# Patient Record
Sex: FEMALE | Race: WHITE | Employment: OTHER | URBAN - METROPOLITAN AREA
[De-identification: names, ages, dates, MRNs, and addresses within clinical notes are randomized per-mention and may not be internally consistent; named-entity substitution may affect disease eponyms.]

---

## 2017-09-25 ENCOUNTER — HOSPITAL ENCOUNTER (EMERGENCY)
Age: 72
Discharge: HOME | End: 2017-09-25
Attending: EMERGENCY MEDICINE
Payer: MEDICARE

## 2017-09-25 ENCOUNTER — APPOINTMENT (OUTPATIENT)
Dept: GENERAL RADIOLOGY | Age: 72
End: 2017-09-25
Attending: EMERGENCY MEDICINE
Payer: MEDICARE

## 2017-09-25 ENCOUNTER — APPOINTMENT (OUTPATIENT)
Dept: CT IMAGING | Age: 72
End: 2017-09-25
Attending: EMERGENCY MEDICINE
Payer: MEDICARE

## 2017-09-25 VITALS
OXYGEN SATURATION: 96 % | WEIGHT: 175 LBS | TEMPERATURE: 98.2 F | HEIGHT: 60 IN | SYSTOLIC BLOOD PRESSURE: 171 MMHG | DIASTOLIC BLOOD PRESSURE: 53 MMHG | RESPIRATION RATE: 16 BRPM | HEART RATE: 71 BPM | BODY MASS INDEX: 34.36 KG/M2

## 2017-09-25 DIAGNOSIS — Z99.2 ESRD ON HEMODIALYSIS (HCC): ICD-10-CM

## 2017-09-25 DIAGNOSIS — E87.5 ACUTE HYPERKALEMIA: Primary | ICD-10-CM

## 2017-09-25 DIAGNOSIS — G25.3 MYOCLONUS: ICD-10-CM

## 2017-09-25 DIAGNOSIS — N18.6 ESRD ON HEMODIALYSIS (HCC): ICD-10-CM

## 2017-09-25 LAB
ANION GAP SERPL CALC-SCNC: 7 MMOL/L (ref 5–15)
BASOPHILS # BLD: 0.1 K/UL (ref 0–0.1)
BASOPHILS NFR BLD: 1 % (ref 0–1)
BUN SERPL-MCNC: 66 MG/DL (ref 6–20)
BUN/CREAT SERPL: 8 (ref 12–20)
CALCIUM SERPL-MCNC: 8.9 MG/DL (ref 8.5–10.1)
CHLORIDE SERPL-SCNC: 97 MMOL/L (ref 97–108)
CO2 SERPL-SCNC: 28 MMOL/L (ref 21–32)
CREAT SERPL-MCNC: 8.64 MG/DL (ref 0.55–1.02)
EOSINOPHIL # BLD: 0.3 K/UL (ref 0–0.4)
EOSINOPHIL NFR BLD: 3 % (ref 0–7)
ERYTHROCYTE [DISTWIDTH] IN BLOOD BY AUTOMATED COUNT: 17.3 % (ref 11.5–14.5)
GLUCOSE SERPL-MCNC: 174 MG/DL (ref 65–100)
HCT VFR BLD AUTO: 37.5 % (ref 35–47)
HGB BLD-MCNC: 11.6 G/DL (ref 11.5–16)
LYMPHOCYTES # BLD: 1.9 K/UL (ref 0.8–3.5)
LYMPHOCYTES NFR BLD: 21 % (ref 12–49)
MAGNESIUM SERPL-MCNC: 2.3 MG/DL (ref 1.6–2.4)
MCH RBC QN AUTO: 29.1 PG (ref 26–34)
MCHC RBC AUTO-ENTMCNC: 30.9 G/DL (ref 30–36.5)
MCV RBC AUTO: 94 FL (ref 80–99)
MONOCYTES # BLD: 0.7 K/UL (ref 0–1)
MONOCYTES NFR BLD: 8 % (ref 5–13)
NEUTS SEG # BLD: 6.2 K/UL (ref 1.8–8)
NEUTS SEG NFR BLD: 67 % (ref 32–75)
PLATELET # BLD AUTO: 261 K/UL (ref 150–400)
POTASSIUM SERPL-SCNC: 6.4 MMOL/L (ref 3.5–5.1)
RBC # BLD AUTO: 3.99 M/UL (ref 3.8–5.2)
SODIUM SERPL-SCNC: 132 MMOL/L (ref 136–145)
WBC # BLD AUTO: 9.1 K/UL (ref 3.6–11)

## 2017-09-25 PROCEDURE — 70450 CT HEAD/BRAIN W/O DYE: CPT

## 2017-09-25 PROCEDURE — 85025 COMPLETE CBC W/AUTO DIFF WBC: CPT | Performed by: EMERGENCY MEDICINE

## 2017-09-25 PROCEDURE — 83735 ASSAY OF MAGNESIUM: CPT | Performed by: EMERGENCY MEDICINE

## 2017-09-25 PROCEDURE — 80048 BASIC METABOLIC PNL TOTAL CA: CPT | Performed by: EMERGENCY MEDICINE

## 2017-09-25 PROCEDURE — 36415 COLL VENOUS BLD VENIPUNCTURE: CPT | Performed by: EMERGENCY MEDICINE

## 2017-09-25 PROCEDURE — 99285 EMERGENCY DEPT VISIT HI MDM: CPT

## 2017-09-25 PROCEDURE — 93005 ELECTROCARDIOGRAM TRACING: CPT

## 2017-09-25 PROCEDURE — 71010 XR CHEST PORT: CPT

## 2017-09-25 RX ORDER — GABAPENTIN 100 MG/1
100 CAPSULE ORAL 2 TIMES DAILY
COMMUNITY

## 2017-09-25 RX ORDER — TRAMADOL HYDROCHLORIDE 50 MG/1
50 TABLET ORAL
COMMUNITY

## 2017-09-25 RX ORDER — CLONIDINE HYDROCHLORIDE 0.1 MG/1
TABLET ORAL 2 TIMES DAILY
COMMUNITY
End: 2017-09-25

## 2017-09-25 RX ORDER — ACETAMINOPHEN 325 MG/1
325 TABLET ORAL
COMMUNITY

## 2017-09-25 RX ORDER — INSULIN GLARGINE 100 [IU]/ML
5 INJECTION, SOLUTION SUBCUTANEOUS
COMMUNITY

## 2017-09-25 RX ORDER — BUDESONIDE AND FORMOTEROL FUMARATE DIHYDRATE 160; 4.5 UG/1; UG/1
2 AEROSOL RESPIRATORY (INHALATION) 2 TIMES DAILY
COMMUNITY

## 2017-09-25 RX ORDER — ALBUTEROL SULFATE 90 UG/1
2 AEROSOL, METERED RESPIRATORY (INHALATION)
COMMUNITY

## 2017-09-25 RX ORDER — INSULIN ASPART 100 [IU]/ML
INJECTION, SOLUTION INTRAVENOUS; SUBCUTANEOUS
COMMUNITY

## 2017-09-25 RX ORDER — FOLIC ACID 1 MG/1
1 TABLET ORAL DAILY
COMMUNITY

## 2017-09-25 RX ORDER — LISINOPRIL 20 MG/1
20 TABLET ORAL DAILY
COMMUNITY

## 2017-09-25 NOTE — ED NOTES
Pt's eyes would slightly roll back in her head. Pt alert x3. Pt states, \"that always happens when I am tired and about to fall asleep. \" Dr. Rachell Joyce told.

## 2017-09-25 NOTE — ED NOTES
Pt resting in room with her eyes closed on her right side, pt alert and oriented x3, NSR on monitor,  vs stable as noted, pt has no new complaints at this time. Call bell within reach, bed in low position and locked. Pt aware to call the RN on the call bell if needs anything. Pt verbalizes understanding.

## 2017-09-25 NOTE — ED PROVIDER NOTES
HPI Comments: 66 y.o. female with past medical history significant for ESRD, DM, and iron deficiency anemia who presents to the ED via EMS with chief complaint of seizure. Per EMS, pt was about 30 minutes into dialysis treatment today when she was noted by staff to have \"seizure like activity\" so she was referred to the ED for further evaluation and did not complete treatment. Pt reports she \"thinks she may have coughed too much and couldn't breathe but does not think she had a seizure. \" Pt states she was sleeping and does not recall the event. Pt denies hx of seizures. Pt denies any complaints at this time. Pt states she wears 2L O2 at home. Pt states she was recently in rehab after being admitted for breathing issues. Pt denies any pain, extremity weakness, nausea, or vomiting. There are no other acute medical complaints voiced at this time. Social Hx: Smoker. Visiting son from out of town. PCP: No primary care provider on file. Note written by Erik Nunez Arm, as dictated by Modesta Alfred MD 12:56 PM     The history is provided by the patient and the EMS personnel. Past Medical History:   Diagnosis Date    Anemia in chronic kidney disease     Diabetes ([facility] [address]     End stage renal disease (HCC)     Iron deficiency anemia        Past Surgical History:   Procedure Laterality Date    HX CHOLECYSTECTOMY      HX HYSTERECTOMY           History reviewed. No pertinent family history. Social History     Social History    Marital status: N/A     Spouse name: N/A    Number of children: N/A    Years of education: N/A     Occupational History    Not on file.      Social History Main Topics    Smoking status: Current Every Day Smoker    Smokeless tobacco: Never Used    Alcohol use No    Drug use: Not on file    Sexual activity: Not on file     Other Topics Concern    Not on file     Social History Narrative    No narrative on file         ALLERGIES: Pcn [penicillins]    Review of Systems   Constitutional: Negative for fever. HENT: Negative for facial swelling. Eyes: Negative for visual disturbance. Respiratory: Positive for cough. Negative for chest tightness. Cardiovascular: Negative for chest pain. Gastrointestinal: Negative for abdominal pain, nausea and vomiting. Genitourinary: Negative for dysuria. Musculoskeletal: Negative for arthralgias, back pain, myalgias and neck pain. Skin: Negative for rash. Neurological: Positive for seizures. Negative for dizziness and weakness. Hematological: Negative for adenopathy. Psychiatric/Behavioral: Negative for suicidal ideas. All other systems reviewed and are negative. Vitals:    09/25/17 1233 09/25/17 1245   BP:  142/48   Pulse: 64 65   Resp: 18 15   Temp: 98.2 °F (36.8 °C)    SpO2: 95% 99%   Weight: 79.4 kg (175 lb)    Height: 5' (1.524 m)             Physical Exam   Constitutional: She is oriented to person, place, and time. She appears well-developed and well-nourished. No distress. HENT:   Head: Normocephalic and atraumatic. Mouth/Throat: Oropharynx is clear and moist.   Eyes: Pupils are equal, round, and reactive to light. No scleral icterus. Neck: Normal range of motion. Neck supple. No thyromegaly present. Cardiovascular: Normal rate, regular rhythm, normal heart sounds and intact distal pulses. No murmur heard. Dialysis graft in left upper extremity with positive thrill. Pulmonary/Chest: Effort normal and breath sounds normal. No respiratory distress. Abdominal: Soft. Bowel sounds are normal. She exhibits no distension. There is no tenderness. Musculoskeletal: Normal range of motion. She exhibits no edema. Neurological: She is alert and oriented to person, place, and time. Skin: Skin is warm and dry. No rash noted. She is not diaphoretic. Nursing note and vitals reviewed.      Note written by Erik Young, as dictated by Aman Hull MD 12:57 PM    MDM  Number of Diagnoses or Management Options  Diagnosis management comments: A:  75yo F from out of town sent in from dialysis center after 30 minutes of seizure out of concern for seizure activity. Pt reports being asleep and having no memory of event. She has no complaints now and is frustrated at being in ED. VS normal.  Exam unremarkable. No h/o seizure. DDx:  Likely had myoclonus with sleeping (pt states her eyes do roll back when falling asleep). Will check ECG, labs and head ct to rule out other more concerning causes of myoclonus. ED Course       Procedures    ED EKG interpretation:  Rhythm: normal sinus rhythm. Rate (approx.): 64.  Axis: normal.  ST segment:  No concerning ST elevations or depressions. This EKG was interpreted by Marimar Epperson MD,ED Provider. HCT unremarkable. Labs sig for K=6.4     PROGRESS NOTE:  2:21 PM   Potassium 6.4 but workup otherwise unremarkable. Pt reluctant to be admitted. Unable to dialyze this afternoon at [facility]. Will consult nephrology for urgent dialysis. CONSULT NOTE:  2:56 PM Marimar Epperson MD spoke with Dr. Myrna Jaime, Consult for Nephrology. Discussed available diagnostic tests and clinical findings. She is in agreement with care plans as outlined. Dr. Myrna Jaime says can discharge pt home. Would not do emergent dialysis for a potassium <7.5. Can get dialysis as scheduled tomorrow.

## 2017-09-25 NOTE — ED TRIAGE NOTES
Pt arrived via EMS from dialysis. Pt was 30 minutes in dialysis and per staff pt had a seizure for 2-3 minutes. Staff told ems her eyes rolled back in her head. Pt states she was sleeping and does not remember any seizure. No hx of seizures. Pt states, \"I feel perfectly fine, I do not know why I am here. \" pt has no current complaints. Pt alert x3.

## 2017-09-25 NOTE — PROGRESS NOTES
[facility]: MED RECONCILIATION    Comments/Recommendations:    Patient is somewhat alert, awake, and oriented   Verified allergies   No Rx Query    Patient had medication list from [facility] at bedside and called [facility] in [place], [place] to verify recent fill history   Patient reported using Symbicort 2 puffs BID, however pharmacist at [address] [place] [address] couldn't verify recent fill history, last fill was 1/2015 and was listed on [facility] paperwork    Patient reported she injects Novolog on a sliding scale prior to meals as needed and injects 5 units of Lantus at bedtime. She reported it used to be 50 units but now she injects 5 units. Paperwork from [address] [place] [address] Ave stated 50 units of Lantus at bedtime. Pharmacist at [address] [place] [address] reported prescription was on hold and never filled for both Novolog and Lantus   Patient reported lisinopril 20 mg 1 tablet daily as did [facility] pharmacist but paperwork from [address] stated lisinopril 20 mg 1 tablet twice a day    Paperwork stated Renal caps 1 capsule daily but patient didn't recall this medication     Medications added:     · none    Medications removed:    · Clonidine 0.1 mg twice daily     Medications adjusted:    · Novolog sliding scale prior to meals prn from no units  · Lantus 5 units at bedtime from no units    Information obtained from: Patient, [facility] paperwork, [facility] pharmacy     Allergies: Pcn [penicillins]    Prior to Admission Medications:   Prior to Admission Medications   Prescriptions Last Dose Informant Patient Reported? Taking?   acetaminophen (TYLENOL) 325 mg tablet 8/25/2017 Self Yes Yes   Sig: Take 325 mg by mouth every six (6) hours as needed for Pain. albuterol (PROAIR HFA) 90 mcg/actuation inhaler  Self Yes Yes   Sig: Take 2 Puffs by inhalation two (2) times daily as needed for Wheezing or Shortness of Breath. budesonide-formoterol (SYMBICORT) 160-4.5 mcg/actuation HFA inhaler 9/24/2017 at pm Self Yes Yes   Sig: Take 2 Puffs by inhalation two (2) times a day. folic acid (FOLVITE) 1 mg tablet 2017 at pm Self Yes Yes   Sig: Take 1 mg by mouth daily. gabapentin (NEURONTIN) 100 mg capsule 2017 at am Self Yes Yes   Sig: Take 100 mg by mouth two (2) times a day. insulin aspart (NOVOLOG) 100 unit/mL injection  Self Yes Yes   Sig: by SubCUTAneous route. Sliding scale prior to meals as needed   insulin glargine (LANTUS) 100 unit/mL injection 2017 at pm Self Yes Yes   Si Units by SubCUTAneous route nightly. lisinopril (PRINIVIL, ZESTRIL) 20 mg tablet 2017 at pm Self Yes Yes   Sig: Take 20 mg by mouth daily. traMADol (ULTRAM) 50 mg tablet  Self Yes Yes   Sig: Take 50 mg by mouth every eight (8) hours as needed for Pain.       Facility-Administered Medications: None     Thank you,   Saurabh Perze, PharmD Candidate, Class of 2018

## 2017-09-25 NOTE — ED NOTES
Pt resting in room on her right side with eyes closed, normal sinus rythm on the monitor, vs stable as noted, pt has no new complaints at this time. Call bell within reach, bed in low position and locked. Pt aware to call the RN on the call bell if needs anything. Pt verbalizes understanding.

## 2017-09-25 NOTE — DISCHARGE INSTRUCTIONS
Hyperkalemia: Care Instructions  Your Care Instructions  Hyperkalemia is too much potassium in the blood. Potassium helps keep the right mix of fluids in your body. It also helps your nerves and muscles work as they should. And it keeps your heartbeat in a normal rhythm. Some things can raise potassium levels. These include some health problems, medicines, and kidney problems. (Normally, your kidneys remove extra potassium.)  Too much potassium can cause nausea. It also can cause a heartbeat that isn't normal. But you may not have any symptoms. Too much potassium can be dangerous. That's why it's important to treat it. If you are taking any of the medicines that can raise your levels, your doctor will ask you to stop. You may get medicines to lower your levels. And you may have to limit or not eat foods that have a lot of potassium. Follow-up care is a key part of your treatment and safety. Be sure to make and go to all appointments, and call your doctor if you are having problems. It's also a good idea to know your test results and keep a list of the medicines you take. How can you care for yourself at home? · Take your medicines exactly as prescribed. Call your doctor if you think you are having a problem with your medicine. · Stop taking certain medicines if your doctor asks you to. They may be causing your high potassium levels. If you have concerns about stopping medicine, talk with your doctor. · If you have kidney, heart, or liver disease and have to limit fluids, talk with your doctor before you increase the amount of fluids you drink. If the doctor says it's okay, drink plenty of fluids. This means drinking enough so that your urine is light yellow or clear like water. · Avoid strenuous exercise until your doctor tells you it is okay. · Potassium is in many foods, including vegetables, fruits, and milk products.  Foods high in potassium include bananas, cantaloupe, broccoli, milk, potatoes, and tomatoes. · Low potassium foods include blueberries, raspberries, cucumber, white or brown rice, spaghetti, and macaroni. · Do not use a salt substitute without talking to your doctor first. Most of these are very high in potassium. · Be sure to tell your doctor about any prescription, over-the-counter, or herbal medicines you take. Some of these can raise potassium. When should you call for help? Call 911 anytime you think you may need emergency care. For example, call if:  · You passed out (lost consciousness). · You have trouble breathing. · You have an unusual heartbeat. Your heart may beat fast or skip beats. Call your doctor now or seek immediate medical care if:  · You have trouble urinating or can urinate only very small amounts. · Your nausea does not get better. Watch closely for changes in your health, and be sure to contact your doctor if:  · You do not get better as expected. · You want more help planning meals. Where can you learn more? Go to http://ana maría-emma.info/. Enter W655 in the search box to learn more about \"Hyperkalemia: Care Instructions. \"  Current as of: August 8, 2016  Content Version: 11.3  © 5199-5906 [employer]. Care instructions adapted under license by [employer] (which disclaims liability or warranty for this information). If you have questions about a medical condition or this instruction, always ask your healthcare professional. [address] any warranty or liability for your use of this information. We hope that we have addressed all of your medical concerns. The examination and treatment you received in the Emergency Department were for an emergent problem and were not intended as complete care. It is important that you follow up with your healthcare provider(s) for ongoing care.  If your symptoms worsen or do not improve as expected, and you are unable to reach your usual health care provider(s), you should return to the Emergency Department. Today's healthcare is undergoing tremendous change, and patient satisfaction surveys are one of the many tools to assess the quality of medical care. You may receive a survey from the [employer] regarding your experience in the Emergency Department. I hope that your experience has been completely positive, particularly the medical care that I provided. As such, please participate in the survey; anything less than excellent does not meet my expectations or intentions. [address] [facility] and [address] [place] [address] participate in nationally recognized quality of care measures. If your blood pressure is greater than 120/80, as reported below, we urge that you seek medical care to address the potential of high blood pressure, commonly known as hypertension. Hypertension can be hereditary or can be caused by certain medical conditions, pain, stress, or \"white coat syndrome. \"       Please make an appointment with your health care provider(s) for follow up of your Emergency Department visit. VITALS:   Patient Vitals for the past 8 hrs:   Temp Pulse Resp BP SpO2   09/25/17 1445 - 71 16 171/53 96 %   09/25/17 1430 - 66 18 (!) 160/91 96 %   09/25/17 1415 - 62 15 142/55 97 %   09/25/17 1400 - 66 16 156/46 97 %   09/25/17 1348 - 62 16 - 98 %   09/25/17 1315 - 64 18 151/59 97 %   09/25/17 1300 - 65 16 147/57 98 %   09/25/17 1245 - 65 15 142/48 99 %   09/25/17 1233 98.2 °F (36.8 °C) 64 18 - 95 %          Thank you for allowing us to provide you with medical care today. We realize that you have many choices for your emergency care needs. Please choose us in the future for any continued health care needs.       Mitzi Wills MD    [facility].   Office: [phone]            Recent Results (from the past 24 hour(s))   CBC WITH AUTOMATED DIFF    Collection Time: 09/25/17  1:07 PM   Result Value Ref Range    WBC 9.1 3.6 - 11.0 K/uL    RBC 3.99 3.80 - 5.20 M/uL    HGB 11.6 11.5 - 16.0 g/dL    HCT 37.5 35.0 - 47.0 %    MCV 94.0 80.0 - 99.0 FL    MCH 29.1 26.0 - 34.0 PG    MCHC 30.9 30.0 - 36.5 g/dL    RDW 17.3 (H) 11.5 - 14.5 %    PLATELET 620 528 - 814 K/uL    NEUTROPHILS 67 32 - 75 %    LYMPHOCYTES 21 12 - 49 %    MONOCYTES 8 5 - 13 %    EOSINOPHILS 3 0 - 7 %    BASOPHILS 1 0 - 1 %    ABS. NEUTROPHILS 6.2 1.8 - 8.0 K/UL    ABS. LYMPHOCYTES 1.9 0.8 - 3.5 K/UL    ABS. MONOCYTES 0.7 0.0 - 1.0 K/UL    ABS. EOSINOPHILS 0.3 0.0 - 0.4 K/UL    ABS. BASOPHILS 0.1 0.0 - 0.1 K/UL   METABOLIC PANEL, BASIC    Collection Time: 09/25/17  1:07 PM   Result Value Ref Range    Sodium 132 (L) 136 - 145 mmol/L    Potassium 6.4 (H) 3.5 - 5.1 mmol/L    Chloride 97 97 - 108 mmol/L    CO2 28 21 - 32 mmol/L    Anion gap 7 5 - 15 mmol/L    Glucose 174 (H) 65 - 100 mg/dL    BUN 66 (H) 6 - 20 MG/DL    Creatinine 8.64 (H) 0.55 - 1.02 MG/DL    BUN/Creatinine ratio 8 (L) 12 - 20      GFR est AA 5 (L) >60 ml/min/1.73m2    GFR est non-AA 5 (L) >60 ml/min/1.73m2    Calcium 8.9 8.5 - 10.1 MG/DL   MAGNESIUM    Collection Time: 09/25/17  1:07 PM   Result Value Ref Range    Magnesium 2.3 1.6 - 2.4 mg/dL       Ct Head Wo Cont    Result Date: 9/25/2017  EXAM:  CT HEAD WO CONT Clinical history: Seizure INDICATION:   seizure COMPARISON: None. CONTRAST:  None. TECHNIQUE: Unenhanced CT of the head was performed using 5 mm images. Brain and bone windows were generated. CT dose reduction was achieved through use of a standardized protocol tailored for this examination and automatic exposure control for dose modulation. FINDINGS: Periventricular white matter hypodensity. There are additional scattered foci of hypodensity in the cerebral white matter. There is no intracranial hemorrhage, extra-axial collection, mass, mass effect or midline shift. The basilar cisterns are open. No acute infarct is identified.  The bone windows demonstrate no abnormalities. The visualized portions of the paranasal sinuses and mastoid air cells are clear. IMPRESSION: No acute intracranial process is demonstrated. Scattered hypodensities in the cerebral white matter likely related to chronic microvascular ischemic change.

## 2017-09-27 LAB
ATRIAL RATE: 64 BPM
CALCULATED P AXIS, ECG09: 28 DEGREES
CALCULATED R AXIS, ECG10: 23 DEGREES
CALCULATED T AXIS, ECG11: 61 DEGREES
DIAGNOSIS, 93000: NORMAL
P-R INTERVAL, ECG05: 216 MS
Q-T INTERVAL, ECG07: 414 MS
QRS DURATION, ECG06: 92 MS
QTC CALCULATION (BEZET), ECG08: 427 MS
VENTRICULAR RATE, ECG03: 64 BPM

## 2019-07-29 ENCOUNTER — APPOINTMENT (OUTPATIENT)
Dept: CT IMAGING | Age: 74
End: 2019-07-29
Attending: PHYSICIAN ASSISTANT
Payer: MEDICARE

## 2019-07-29 ENCOUNTER — HOSPITAL ENCOUNTER (EMERGENCY)
Age: 74
Discharge: HOME | End: 2019-07-29
Attending: EMERGENCY MEDICINE
Payer: MEDICARE

## 2019-07-29 ENCOUNTER — APPOINTMENT (OUTPATIENT)
Dept: GENERAL RADIOLOGY | Age: 74
End: 2019-07-29
Attending: EMERGENCY MEDICINE
Payer: MEDICARE

## 2019-07-29 VITALS
HEART RATE: 71 BPM | BODY MASS INDEX: 34.18 KG/M2 | DIASTOLIC BLOOD PRESSURE: 90 MMHG | SYSTOLIC BLOOD PRESSURE: 129 MMHG | OXYGEN SATURATION: 92 % | RESPIRATION RATE: 21 BRPM | TEMPERATURE: 98.4 F | WEIGHT: 175 LBS

## 2019-07-29 DIAGNOSIS — R06.02 SOB (SHORTNESS OF BREATH): Primary | ICD-10-CM

## 2019-07-29 DIAGNOSIS — R53.83 FATIGUE, UNSPECIFIED TYPE: ICD-10-CM

## 2019-07-29 LAB
ALBUMIN SERPL-MCNC: 3.8 G/DL (ref 3.5–5)
ALBUMIN/GLOB SERPL: 1.1 {RATIO} (ref 1.1–2.2)
ALP SERPL-CCNC: 94 U/L (ref 45–117)
ALT SERPL-CCNC: 10 U/L (ref 12–78)
ANION GAP SERPL CALC-SCNC: 4 MMOL/L (ref 5–15)
APPEARANCE UR: ABNORMAL
AST SERPL-CCNC: 17 U/L (ref 15–37)
BACTERIA URNS QL MICRO: ABNORMAL /HPF
BASOPHILS # BLD: 0.1 K/UL (ref 0–0.1)
BASOPHILS NFR BLD: 1 % (ref 0–1)
BILIRUB SERPL-MCNC: 0.7 MG/DL (ref 0.2–1)
BILIRUB UR QL: NEGATIVE
BNP SERPL-MCNC: ABNORMAL PG/ML
BUN SERPL-MCNC: 34 MG/DL (ref 6–20)
BUN/CREAT SERPL: 5 (ref 12–20)
CALCIUM SERPL-MCNC: 9.4 MG/DL (ref 8.5–10.1)
CHLORIDE SERPL-SCNC: 102 MMOL/L (ref 97–108)
CO2 SERPL-SCNC: 33 MMOL/L (ref 21–32)
COLOR UR: ABNORMAL
CREAT SERPL-MCNC: 7.38 MG/DL (ref 0.55–1.02)
DIFFERENTIAL METHOD BLD: ABNORMAL
EOSINOPHIL # BLD: 0.2 K/UL (ref 0–0.4)
EOSINOPHIL NFR BLD: 3 % (ref 0–7)
EPITH CASTS URNS QL MICRO: ABNORMAL /LPF
ERYTHROCYTE [DISTWIDTH] IN BLOOD BY AUTOMATED COUNT: 15.2 % (ref 11.5–14.5)
GLOBULIN SER CALC-MCNC: 3.4 G/DL (ref 2–4)
GLUCOSE SERPL-MCNC: 117 MG/DL (ref 65–100)
GLUCOSE UR STRIP.AUTO-MCNC: NEGATIVE MG/DL
HCT VFR BLD AUTO: 33 % (ref 35–47)
HGB BLD-MCNC: 10.3 G/DL (ref 11.5–16)
HGB UR QL STRIP: NEGATIVE
IMM GRANULOCYTES # BLD AUTO: 0 K/UL (ref 0–0.04)
IMM GRANULOCYTES NFR BLD AUTO: 1 % (ref 0–0.5)
KETONES UR QL STRIP.AUTO: NEGATIVE MG/DL
LACTATE BLD-SCNC: 0.5 MMOL/L (ref 0.4–2)
LEUKOCYTE ESTERASE UR QL STRIP.AUTO: NEGATIVE
LYMPHOCYTES # BLD: 1.8 K/UL (ref 0.8–3.5)
LYMPHOCYTES NFR BLD: 24 % (ref 12–49)
MCH RBC QN AUTO: 31 PG (ref 26–34)
MCHC RBC AUTO-ENTMCNC: 31.2 G/DL (ref 30–36.5)
MCV RBC AUTO: 99.4 FL (ref 80–99)
MONOCYTES # BLD: 0.6 K/UL (ref 0–1)
MONOCYTES NFR BLD: 8 % (ref 5–13)
NEUTS SEG # BLD: 4.7 K/UL (ref 1.8–8)
NEUTS SEG NFR BLD: 63 % (ref 32–75)
NITRITE UR QL STRIP.AUTO: NEGATIVE
NRBC # BLD: 0 K/UL (ref 0–0.01)
NRBC BLD-RTO: 0 PER 100 WBC
PH UR STRIP: 8.5 [PH] (ref 5–8)
PLATELET # BLD AUTO: 230 K/UL (ref 150–400)
PMV BLD AUTO: 11.7 FL (ref 8.9–12.9)
POTASSIUM SERPL-SCNC: 5.4 MMOL/L (ref 3.5–5.1)
PROT SERPL-MCNC: 7.2 G/DL (ref 6.4–8.2)
PROT UR STRIP-MCNC: 300 MG/DL
RBC # BLD AUTO: 3.32 M/UL (ref 3.8–5.2)
RBC #/AREA URNS HPF: ABNORMAL /HPF (ref 0–5)
SODIUM SERPL-SCNC: 139 MMOL/L (ref 136–145)
SP GR UR REFRACTOMETRY: 1.01 (ref 1–1.03)
TROPONIN I SERPL-MCNC: <0.05 NG/ML
UROBILINOGEN UR QL STRIP.AUTO: 0.2 EU/DL (ref 0.2–1)
WBC # BLD AUTO: 7.4 K/UL (ref 3.6–11)
WBC URNS QL MICRO: ABNORMAL /HPF (ref 0–4)

## 2019-07-29 PROCEDURE — 80053 COMPREHEN METABOLIC PANEL: CPT

## 2019-07-29 PROCEDURE — 71250 CT THORAX DX C-: CPT

## 2019-07-29 PROCEDURE — 83880 ASSAY OF NATRIURETIC PEPTIDE: CPT

## 2019-07-29 PROCEDURE — 84484 ASSAY OF TROPONIN QUANT: CPT

## 2019-07-29 PROCEDURE — 85025 COMPLETE CBC W/AUTO DIFF WBC: CPT

## 2019-07-29 PROCEDURE — 36415 COLL VENOUS BLD VENIPUNCTURE: CPT

## 2019-07-29 PROCEDURE — 81001 URINALYSIS AUTO W/SCOPE: CPT

## 2019-07-29 PROCEDURE — 99285 EMERGENCY DEPT VISIT HI MDM: CPT

## 2019-07-29 PROCEDURE — 87040 BLOOD CULTURE FOR BACTERIA: CPT

## 2019-07-29 PROCEDURE — 93005 ELECTROCARDIOGRAM TRACING: CPT

## 2019-07-29 PROCEDURE — 83605 ASSAY OF LACTIC ACID: CPT

## 2019-07-29 PROCEDURE — 71045 X-RAY EXAM CHEST 1 VIEW: CPT

## 2019-07-29 RX ORDER — SODIUM CHLORIDE 0.9 % (FLUSH) 0.9 %
5-10 SYRINGE (ML) INJECTION AS NEEDED
Status: DISCONTINUED | OUTPATIENT
Start: 2019-07-29 | End: 2019-07-30 | Stop reason: HOSPADM

## 2019-07-29 RX ORDER — FUROSEMIDE 10 MG/ML
20 INJECTION INTRAMUSCULAR; INTRAVENOUS
Status: DISCONTINUED | OUTPATIENT
Start: 2019-07-29 | End: 2019-07-29

## 2019-07-29 NOTE — ED TRIAGE NOTES
Cough and nausea x 5 days, sent by PCP for pneumonia rule out. Dialysis patient, t,th, sat. On 3 L all the time.

## 2019-07-29 NOTE — ED PROVIDER NOTES
I have evaluated the patient as the Provider in Triage. I have reviewed Her vital signs and the triage nurse assessment. I have talked with the patient and any available family and advised that I am the provider in triage and have ordered the appropriate study to initiate their work up based on the clinical presentation during my assessment. I have advised that the patient will be accommodated in the Main ED as soon as possible. I have also requested to contact the triage nurse or myself immediately if the patient experiences any changes in their condition during this brief waiting period. Pt c/o cough, accompanied by nausea for 5 days. Pt's PCP sent her to the ED to rule out pneumonia. Pt receives hemodialysis (Tu,Th, & Sat), last treatment was Saturday. Pt is on 3L of O2 at baseline. Pt denies fever. Note written by Erik Shay, as dictated by Roldan Beltran MD 5:00 PM      Apollo Samuels is a 74 y.o. female  who presents by private vehicle to ER with c/o Patient presents with:  Cough  Nausea  Patient presents with family per family patient has been more fatigued over the last few days. Patient was seen by PCP today and instructed to come to ED for further evaluation. Patient denies chest pain. Patient reports history of COPD and is on oxygen at home. Recently had to increase to 3L. She specifically denies any fevers, chills,  vomiting, chest pain,  headache, rash, diarrhea, abdominal pain, urinary/bowel changes, sweating or weight loss.     PCP: Jae, MD Irena   PMHx significant for: Past Medical History:  No date: Anemia in chronic kidney disease  No date: Diabetes ([facility] [address]  No date: End stage renal disease ([facility] [address]  No date: Iron deficiency anemia   PSHx significant for: Past Surgical History:  No date: HX CHOLECYSTECTOMY  No date: HX HYSTERECTOMY  Social Hx: Tobacco use: Social History    Tobacco Use      Smoking status: Current Every Day Smoker      Smokeless tobacco: Never Used  ; EtOH use: The patient states she drinks 0 per week.; Illicit Drug use: Allergies:   -- Pcn (Penicillins) -- Rash and Itching    There are no other complaints, changes or physical findings at this time. Past Medical History:   Diagnosis Date    Anemia in chronic kidney disease     [facility] ([facility] [address]     End stage renal disease (HCC)     Iron deficiency anemia        Past Surgical History:   Procedure Laterality Date    HX CHOLECYSTECTOMY      HX HYSTERECTOMY           No family history on file.     Social History     Socioeconomic History    Marital status:      Spouse name: Not on file    Number of children: Not on file    Years of education: Not on file    Highest education level: Not on file   Occupational History    Not on file   Social Needs    Financial resource strain: Not on file    Food insecurity:     Worry: Not on file     Inability: Not on file    Transportation needs:     Medical: Not on file     Non-medical: Not on file   Tobacco Use    Smoking status: Current Every Day Smoker    Smokeless tobacco: Never Used   Substance and Sexual Activity    Alcohol use: No    Drug use: Not on file    Sexual activity: Not on file   Lifestyle    Physical activity:     Days per week: Not on file     Minutes per session: Not on file    Stress: Not on file   Relationships    Social connections:     Talks on phone: Not on file     Gets together: Not on file     Attends Synagogue service: Not on file     Active member of club or organization: Not on file     Attends meetings of clubs or organizations: Not on file     Relationship status: Not on file    Intimate partner violence:     Fear of current or ex partner: Not on file     Emotionally abused: Not on file     Physically abused: Not on file     Forced sexual activity: Not on file   Other Topics Concern    Not on file   Social History Narrative    Not on file         ALLERGIES: Pcn [penicillins]    Review of Systems Constitutional: Negative for activity change, chills and fever. HENT: Negative for congestion, rhinorrhea and sore throat. Respiratory: Positive for cough and shortness of breath. Cardiovascular: Negative for chest pain and leg swelling. Gastrointestinal: Positive for nausea. Negative for abdominal pain, diarrhea and vomiting. Genitourinary: Negative for dysuria, vaginal bleeding and vaginal discharge. Musculoskeletal: Negative for arthralgias and myalgias. Neurological: Negative for dizziness. Psychiatric/Behavioral: Negative for confusion. All other systems reviewed and are negative. There were no vitals filed for this visit. Physical Exam   Constitutional: She is oriented to person, place, and time. She appears well-developed. Non-toxic appearance. She does not have a sickly appearance. She appears ill. No distress. HENT:   Head: Normocephalic and atraumatic. Right Ear: External ear normal.   Left Ear: External ear normal.   Nose: Nose normal.   Mouth/Throat: Oropharynx is clear and moist. No oropharyngeal exudate. Eyes: Conjunctivae, EOM and lids are normal. Right eye exhibits no discharge. Left eye exhibits no discharge. Neck: Normal range of motion. No tracheal deviation present. No thyromegaly present. Cardiovascular: Normal rate, regular rhythm, normal heart sounds and intact distal pulses. Pulmonary/Chest: Effort normal and breath sounds normal.   Abdominal: Soft. Normal appearance and bowel sounds are normal.   Musculoskeletal: Normal range of motion. Neurological: She is alert and oriented to person, place, and time. Skin: Skin is warm and dry. Psychiatric: She has a normal mood and affect. Judgment normal.        MDM  Number of Diagnoses or Management Options  Fatigue, unspecified type:   SOB (shortness of breath):   Diagnosis management comments: Assesment/Plan- 74 y.o.  Patient presents with:  Cough  Nausea  differential includes: pneumonia, COPD exacerbation, CHF, unstable angina. Labs and imaging reviewed with elevated creatinine, elevated pro-bnp, no acute findings on xray or CT. Patient with normal troponin. Patient requesting to go home. Recommend PCP, cardiology follow up. Patient educated on reasons to return to the ED.            Procedures

## 2019-07-30 LAB
ATRIAL RATE: 59 BPM
CALCULATED P AXIS, ECG09: 86 DEGREES
CALCULATED R AXIS, ECG10: 9 DEGREES
CALCULATED T AXIS, ECG11: 74 DEGREES
DIAGNOSIS, 93000: NORMAL
P-R INTERVAL, ECG05: 218 MS
Q-T INTERVAL, ECG07: 442 MS
QRS DURATION, ECG06: 84 MS
QTC CALCULATION (BEZET), ECG08: 437 MS
VENTRICULAR RATE, ECG03: 59 BPM

## 2019-07-30 NOTE — DISCHARGE INSTRUCTIONS
Patient Education        Fatigue: Care Instructions  Your Care Instructions    Fatigue is a feeling of tiredness, exhaustion, or lack of energy. You may feel fatigue because of too much or not enough activity. It can also come from stress, lack of sleep, boredom, and poor diet. Many medical problems, such as viral infections, can cause fatigue. Emotional problems, especially depression, are often the cause of fatigue. Fatigue is most often a symptom of another problem. Treatment for fatigue depends on the cause. For example, if you have fatigue because you have a certain health problem, treating this problem also treats your fatigue. If depression or anxiety is the cause, treatment may help. Follow-up care is a key part of your treatment and safety. Be sure to make and go to all appointments, and call your doctor if you are having problems. It's also a good idea to know your test results and keep a list of the medicines you take. How can you care for yourself at home? · Get regular exercise. But don't overdo it. Go back and forth between rest and exercise. · Get plenty of rest.  · Eat a healthy diet. Do not skip meals, especially breakfast.  · Reduce your use of caffeine, tobacco, and alcohol. Caffeine is most often found in coffee, tea, cola drinks, and chocolate. · Limit medicines that can cause fatigue. This includes tranquilizers and cold and allergy medicines. When should you call for help? Watch closely for changes in your health, and be sure to contact your doctor if:    · You have new symptoms such as fever or a rash.     · Your fatigue gets worse.     · You have been feeling down, depressed, or hopeless. Or you may have lost interest in things that you usually enjoy.     · You are not getting better as expected. Where can you learn more? Go to http://ana maría-emma.info/. Enter R482 in the search box to learn more about \"Fatigue: Care Instructions. \"  Current as of: September 23, 2018  Content Version: 12.1  © 4688-0466 [employer]. Care instructions adapted under license by [employer] (which disclaims liability or warranty for this information). If you have questions about a medical condition or this instruction, always ask your healthcare professional. [address] any warranty or liability for your use of this information. Patient Education        Shortness of Breath: Care Instructions  Your Care Instructions  Shortness of breath has many causes. Sometimes conditions such as anxiety can lead to shortness of breath. Some people get mild shortness of breath when they exercise. Trouble breathing also can be a symptom of a serious problem, such as asthma, lung disease, emphysema, heart problems, and pneumonia. If your shortness of breath continues, you may need tests and treatment. Watch for any changes in your breathing and other symptoms. Follow-up care is a key part of your treatment and safety. Be sure to make and go to all appointments, and call your doctor if you are having problems. It's also a good idea to know your test results and keep a list of the medicines you take. How can you care for yourself at home? · Do not smoke or allow others to smoke around you. If you need help quitting, talk to your doctor about stop-smoking programs and medicines. These can increase your chances of quitting for good. · Get plenty of rest and sleep. · Take your medicines exactly as prescribed. Call your doctor if you think you are having a problem with your medicine. · Find healthy ways to deal with stress. ? Exercise daily. ? Get plenty of sleep. ? Eat regularly and well. When should you call for help? Call 911 anytime you think you may need emergency care. For example, call if:    · You have severe shortness of breath.     · You have symptoms of a heart attack. These may include:  ?  Chest pain or pressure, or a strange feeling in the chest.  ? Sweating. ? Shortness of breath. ? Nausea or vomiting. ? Pain, pressure, or a strange feeling in the back, neck, jaw, or upper belly or in one or both shoulders or arms. ? Lightheadedness or sudden weakness. ? A fast or irregular heartbeat. After you call 911, the  may tell you to chew 1 adult-strength or 2 to 4 low-dose aspirin. Wait for an ambulance. Do not try to drive yourself.    Call your doctor now or seek immediate medical care if:    · Your shortness of breath gets worse or you start to wheeze. Wheezing is a high-pitched sound when you breathe.     · You wake up at night out of breath or have to prop your head up on several pillows to breathe.     · You are short of breath after only light activity or while at rest.    Watch closely for changes in your health, and be sure to contact your doctor if:    · You do not get better over the next 1 to 2 days. Where can you learn more? Go to http://ana maría-emma.info/. Enter S780 in the search box to learn more about \"Shortness of Breath: Care Instructions. \"  Current as of: September 5, 2018  Content Version: 12.1  © 5200-8629 [employer]. Care instructions adapted under license by [employer] (which disclaims liability or warranty for this information). If you have questions about a medical condition or this instruction, always ask your healthcare professional. [place] [address] any warranty or liability for your use of this information.

## 2019-08-04 LAB
BACTERIA SPEC CULT: NORMAL
BACTERIA SPEC CULT: NORMAL
SERVICE CMNT-IMP: NORMAL
SERVICE CMNT-IMP: NORMAL